# Patient Record
Sex: MALE | Race: BLACK OR AFRICAN AMERICAN | URBAN - METROPOLITAN AREA
[De-identification: names, ages, dates, MRNs, and addresses within clinical notes are randomized per-mention and may not be internally consistent; named-entity substitution may affect disease eponyms.]

---

## 2024-02-08 ENCOUNTER — OFFICE VISIT (OUTPATIENT)
Age: 24
End: 2024-02-08

## 2024-02-08 DIAGNOSIS — M67.432 GANGLION CYST OF DORSUM OF LEFT WRIST: Primary | ICD-10-CM

## 2024-02-08 DIAGNOSIS — M25.532 LEFT WRIST PAIN: ICD-10-CM

## 2024-02-08 NOTE — PROGRESS NOTES
Porter Sood is a 23 y.o. male right handed, .  Worker's Compensation and legal considerations: none    Chief Complaint   Patient presents with    Wrist Pain     left     Pain Score:   6    Subjective:     HPI: Patient presents today with concern of a bump on the top of his left wrist.  He reports it has been there for nearly a decade.  The size waxes and wanes sometimes.  Lately, he has been noticing more significant pain especially with hyperextension of the wrist including when he does push-ups.  Additionally he adds that he has had some intermittent numbness and tingling about the hand since Monday.    Date of onset: Chronic  Injury: No  Prior Treatment: No    ROS: Review of Systems - General ROS: negative except HPI    History reviewed. No pertinent past medical history.    Past Surgical History:   Procedure Laterality Date    HEENT      ear surgery in childhood         No current outpatient medications on file.     No current facility-administered medications for this visit.       Allergies   Allergen Reactions    Penicillins Other (See Comments)     unknown         There were no vitals taken for this visit.  Physical Exam  Vitals and nursing note reviewed.   Constitutional:       Appearance: Normal appearance.   HENT:      Head: Normocephalic and atraumatic.   Cardiovascular:      Pulses: Normal pulses.   Pulmonary:      Effort: Pulmonary effort is normal. No respiratory distress.   Musculoskeletal:         General: Tenderness present. No swelling, deformity or signs of injury. Normal range of motion.      Cervical back: Normal range of motion and neck supple.      Right lower leg: No edema.      Left lower leg: No edema.   Skin:     General: Skin is warm and dry.      Capillary Refill: Capillary refill takes less than 2 seconds.      Findings: No bruising or erythema.   Neurological:      General: No focal deficit present.      Mental Status: He is alert and oriented to person, place, and time.

## 2024-03-11 ENCOUNTER — OFFICE VISIT (OUTPATIENT)
Age: 24
End: 2024-03-11

## 2024-03-11 VITALS
BODY MASS INDEX: 27.17 KG/M2 | HEIGHT: 73 IN | DIASTOLIC BLOOD PRESSURE: 64 MMHG | SYSTOLIC BLOOD PRESSURE: 112 MMHG | HEART RATE: 58 BPM | TEMPERATURE: 96 F | WEIGHT: 205 LBS

## 2024-03-11 DIAGNOSIS — M67.432 GANGLION CYST OF DORSUM OF LEFT WRIST: Primary | ICD-10-CM

## 2024-03-11 DIAGNOSIS — Z01.818 PRE-OPERATIVE EXAM: ICD-10-CM

## 2024-03-11 PROCEDURE — 99024 POSTOP FOLLOW-UP VISIT: CPT

## 2024-03-11 NOTE — PROGRESS NOTES
3/21/2024 at the [facility]    Discussed operative versus nonoperative treatment, postoperative convalescence, and answered all questions.    Note given for patient to have no lift, push, pull, carry, pull ups, push-ups or any physical activity over 5 pounds of the left upper extremity until seen in postop    Return in about 3 weeks (around 4/1/2024) for Post op.     Plan was reviewed with patient, who verbalized agreement and understanding of the plan.     Annie Lockett PA-C  3/11/2024 4:57 PM      Note: This note was completed using voice recognition software.  Any typographical/name errors or mistakes are unintentional.

## 2024-03-21 ENCOUNTER — HOSPITAL ENCOUNTER (OUTPATIENT)
Facility: HOSPITAL | Age: 24
Setting detail: SPECIMEN
Discharge: HOME | End: 2024-03-21
Payer: OTHER GOVERNMENT

## 2024-03-21 DIAGNOSIS — M67.432 GANGLION CYST OF DORSUM OF LEFT WRIST: Primary | ICD-10-CM

## 2024-03-21 DIAGNOSIS — Z98.890 S/P EXCISION OF GANGLION CYST: ICD-10-CM

## 2024-03-21 PROCEDURE — 88304 TISSUE EXAM BY PATHOLOGIST: CPT

## 2024-03-21 RX ORDER — HYDROCODONE BITARTRATE AND ACETAMINOPHEN 5; 325 MG/1; MG/1
1 TABLET ORAL EVERY 6 HOURS PRN
Qty: 20 TABLET | Refills: 0 | Status: SHIPPED | OUTPATIENT
Start: 2024-03-21 | End: 2024-03-26

## 2024-03-21 RX ORDER — DICLOFENAC SODIUM 75 MG/1
75 TABLET, DELAYED RELEASE ORAL 2 TIMES DAILY
Qty: 20 TABLET | Refills: 0 | Status: SHIPPED | OUTPATIENT
Start: 2024-03-21 | End: 2024-03-31

## 2024-04-01 ENCOUNTER — OFFICE VISIT (OUTPATIENT)
Age: 24
End: 2024-04-01

## 2024-04-01 VITALS — BODY MASS INDEX: 27.05 KG/M2 | HEIGHT: 73 IN

## 2024-04-01 DIAGNOSIS — M67.432 GANGLION CYST OF DORSUM OF LEFT WRIST: Primary | ICD-10-CM

## 2024-04-01 DIAGNOSIS — Z98.890 POST-OPERATIVE STATE: ICD-10-CM

## 2024-04-01 PROCEDURE — 99024 POSTOP FOLLOW-UP VISIT: CPT

## 2024-04-01 NOTE — PROGRESS NOTES
Porter Sood is a 23 y.o. male right handed, .  Worker's Compensation and legal considerations: none    Chief Complaint   Patient presents with    Post-Op Check     Left wrist dorsal ganglion cyst excision     Pain Score:   1    Subjective:     04/01/24 HPI: Patient presents today for a post operative visit approximately 2 weeks s/p left dorsal ganglion cyst excision on 3/21/2024 with Dr. Tubbs. Today, the patient reports to be quite happy with his surgical outcome and to be tolerating physical activity.     3/11/2024 H&P HPI: Patient presents today for a preoperative history and physical exam as they are scheduled for a left wrist dorsal ganglion cyst excision on 3/21/2024 with Dr. Tubbs. Patient denies any changes to their medical history since their last evaluation with us.  He voices no other concerns today.    HPI: Patient presents today with concern of a bump on the top of his left wrist.  He reports it has been there for nearly a decade.  The size waxes and wanes sometimes.  Lately, he has been noticing more significant pain especially with hyperextension of the wrist including when he does push-ups.  Additionally he adds that he has had some intermittent numbness and tingling about the hand since Monday.    Date of onset: Chronic  Injury: No  Prior Treatment: No    ROS: Review of Systems - General ROS: negative except HPI    History reviewed. No pertinent past medical history.    Past Surgical History:   Procedure Laterality Date    HEENT      ear surgery in childhood         Current Outpatient Medications   Medication Sig Dispense Refill    diclofenac (VOLTAREN) 75 MG EC tablet Take 1 tablet by mouth 2 times daily for 10 days 20 tablet 0     No current facility-administered medications for this visit.       Allergies   Allergen Reactions    Penicillins Other (See Comments)     unknown         Ht 1.854 m (6' 1\")   BMI 27.05 kg/m²   Physical Exam  Vitals and nursing note reviewed.